# Patient Record
Sex: FEMALE | Race: WHITE | NOT HISPANIC OR LATINO | Employment: FULL TIME | URBAN - METROPOLITAN AREA
[De-identification: names, ages, dates, MRNs, and addresses within clinical notes are randomized per-mention and may not be internally consistent; named-entity substitution may affect disease eponyms.]

---

## 2022-09-06 ENCOUNTER — TELEPHONE (OUTPATIENT)
Dept: PSYCHIATRY | Facility: CLINIC | Age: 22
End: 2022-09-06

## 2023-05-24 ENCOUNTER — OFFICE VISIT (OUTPATIENT)
Dept: ENDOCRINOLOGY | Facility: CLINIC | Age: 23
End: 2023-05-24
Payer: COMMERCIAL

## 2023-05-24 ENCOUNTER — LAB VISIT (OUTPATIENT)
Dept: LAB | Facility: HOSPITAL | Age: 23
End: 2023-05-24
Attending: INTERNAL MEDICINE
Payer: COMMERCIAL

## 2023-05-24 VITALS
BODY MASS INDEX: 34.25 KG/M2 | HEART RATE: 82 BPM | DIASTOLIC BLOOD PRESSURE: 68 MMHG | OXYGEN SATURATION: 97 % | HEIGHT: 63 IN | SYSTOLIC BLOOD PRESSURE: 114 MMHG | WEIGHT: 193.31 LBS

## 2023-05-24 DIAGNOSIS — E78.00 HYPERCHOLESTEREMIA: ICD-10-CM

## 2023-05-24 DIAGNOSIS — E03.9 HYPOTHYROIDISM, UNSPECIFIED TYPE: ICD-10-CM

## 2023-05-24 DIAGNOSIS — E11.9 TYPE 2 DIABETES MELLITUS WITHOUT COMPLICATION, WITHOUT LONG-TERM CURRENT USE OF INSULIN: ICD-10-CM

## 2023-05-24 DIAGNOSIS — R94.6 ABNORMAL THYROID FUNCTION TEST: ICD-10-CM

## 2023-05-24 DIAGNOSIS — E22.1 HYPERPROLACTINEMIA: ICD-10-CM

## 2023-05-24 DIAGNOSIS — K76.0 FATTY LIVER: ICD-10-CM

## 2023-05-24 DIAGNOSIS — E88.819 INSULIN RESISTANCE: ICD-10-CM

## 2023-05-24 LAB
PROLACTIN SERPL IA-MCNC: 37 NG/ML (ref 5.2–26.5)
TSH SERPL DL<=0.005 MIU/L-ACNC: 2.93 UIU/ML (ref 0.4–4)

## 2023-05-24 PROCEDURE — 3078F PR MOST RECENT DIASTOLIC BLOOD PRESSURE < 80 MM HG: ICD-10-PCS | Mod: CPTII,S$GLB,, | Performed by: INTERNAL MEDICINE

## 2023-05-24 PROCEDURE — 1159F MED LIST DOCD IN RCRD: CPT | Mod: CPTII,S$GLB,, | Performed by: INTERNAL MEDICINE

## 2023-05-24 PROCEDURE — 3008F PR BODY MASS INDEX (BMI) DOCUMENTED: ICD-10-PCS | Mod: CPTII,S$GLB,, | Performed by: INTERNAL MEDICINE

## 2023-05-24 PROCEDURE — 99999 PR PBB SHADOW E&M-EST. PATIENT-LVL IV: ICD-10-PCS | Mod: PBBFAC,,, | Performed by: INTERNAL MEDICINE

## 2023-05-24 PROCEDURE — 3074F SYST BP LT 130 MM HG: CPT | Mod: CPTII,S$GLB,, | Performed by: INTERNAL MEDICINE

## 2023-05-24 PROCEDURE — 3044F HG A1C LEVEL LT 7.0%: CPT | Mod: CPTII,S$GLB,, | Performed by: INTERNAL MEDICINE

## 2023-05-24 PROCEDURE — 3008F BODY MASS INDEX DOCD: CPT | Mod: CPTII,S$GLB,, | Performed by: INTERNAL MEDICINE

## 2023-05-24 PROCEDURE — 36415 COLL VENOUS BLD VENIPUNCTURE: CPT | Performed by: INTERNAL MEDICINE

## 2023-05-24 PROCEDURE — 1159F PR MEDICATION LIST DOCUMENTED IN MEDICAL RECORD: ICD-10-PCS | Mod: CPTII,S$GLB,, | Performed by: INTERNAL MEDICINE

## 2023-05-24 PROCEDURE — 99204 PR OFFICE/OUTPT VISIT, NEW, LEVL IV, 45-59 MIN: ICD-10-PCS | Mod: S$GLB,,, | Performed by: INTERNAL MEDICINE

## 2023-05-24 PROCEDURE — 99999 PR PBB SHADOW E&M-EST. PATIENT-LVL IV: CPT | Mod: PBBFAC,,, | Performed by: INTERNAL MEDICINE

## 2023-05-24 PROCEDURE — 99204 OFFICE O/P NEW MOD 45 MIN: CPT | Mod: S$GLB,,, | Performed by: INTERNAL MEDICINE

## 2023-05-24 PROCEDURE — 3078F DIAST BP <80 MM HG: CPT | Mod: CPTII,S$GLB,, | Performed by: INTERNAL MEDICINE

## 2023-05-24 PROCEDURE — 84146 ASSAY OF PROLACTIN: CPT | Performed by: INTERNAL MEDICINE

## 2023-05-24 PROCEDURE — 3074F PR MOST RECENT SYSTOLIC BLOOD PRESSURE < 130 MM HG: ICD-10-PCS | Mod: CPTII,S$GLB,, | Performed by: INTERNAL MEDICINE

## 2023-05-24 PROCEDURE — 84443 ASSAY THYROID STIM HORMONE: CPT | Performed by: INTERNAL MEDICINE

## 2023-05-24 PROCEDURE — 3044F PR MOST RECENT HEMOGLOBIN A1C LEVEL <7.0%: ICD-10-PCS | Mod: CPTII,S$GLB,, | Performed by: INTERNAL MEDICINE

## 2023-05-24 RX ORDER — PROPRANOLOL HYDROCHLORIDE 10 MG/1
10 TABLET ORAL 2 TIMES DAILY
COMMUNITY
Start: 2023-05-23

## 2023-05-24 RX ORDER — METFORMIN HYDROCHLORIDE 500 MG/1
500 TABLET, EXTENDED RELEASE ORAL 2 TIMES DAILY WITH MEALS
Qty: 60 TABLET | Refills: 3 | Status: SHIPPED | OUTPATIENT
Start: 2023-05-24 | End: 2024-05-23

## 2023-05-24 RX ORDER — OXCARBAZEPINE 300 MG/1
TABLET, FILM COATED ORAL
COMMUNITY
Start: 2023-05-23

## 2023-05-24 RX ORDER — TRETINOIN 0.5 MG/G
CREAM TOPICAL
COMMUNITY
Start: 2023-01-23

## 2023-05-24 NOTE — ASSESSMENT & PLAN NOTE
Single elevated level   Plan to check today   If TSH between 4 - 10 would increase to 50 mcg   Repeat in three to four months

## 2023-05-24 NOTE — ASSESSMENT & PLAN NOTE
[facility] - [facility] 3.7  Discussed dietary changes   Have provided hand out     Has 1 hr of 225 and fasting of 100   Ok to start metformin, discussed s/e and benefits.   Plan to titrate slowly to avoid s/e

## 2023-05-24 NOTE — PATIENT INSTRUCTIONS
Goal weight loss 5% body weight ( 10 lbs)  Try the following for six weeks.   Weigh yourself daily    Simple plan: avoid anything made with flour or sugar.     Increase vegetables, lean proteins and limit carbohydrates.     Choose high fiber carbohydrates, that is a carbohydrate that has more than 3 - 5 grams of fiber per serving. Examples of starchy foods that are good for you are beans, squash.      Drink plenty of water and avoid drinks with sugar such as regular sodas.     No snacking     Nutrition plan:  1) lower carbohydrate plans   2) intermittent fasting     Book:   How to lose weight for the last time, Dr. Anu Guzman     Instructions for metformin:  Start with Metformin 500 mg XR one tablet for 7 dys - 10 dys  Then increase to Metformin 500 mg XR one tablet twice a day for the remainder of the month.     Stay on this dose and send me a message if you are tolerating well. Then we can increase dose of Metformin to 750 mg1 tablet twice a day.    Side effects include abdominal discomfort and diarrhea. It usually resolves on its own. Take the medicine with food as this helps.     Plan to start spironolactone for hair growth, in about one month.     Spironolactone is a weak diuretic that your body gets used to after a few days. It works well to block the effects of testosterone.     We recommend you avoid pregnancy while using this medications as it can cause fetal malformations.   We recommend using barrier contraception or a contraceptive device or pill.     Typically we start with spironolactone 50 mg one tablet twice a day. We typically check a potassium level about one month after starting the medication.     Please let me know how you feel after the first month of use. If you are feeling well we increase the dose to 50 mg twice daily.       Follow up in six months, call July 3rd for an appointment in November.

## 2023-05-24 NOTE — PROGRESS NOTES
Subjective:      Patient ID: Jodie Siegel is a 22 y.o. female.    Chief Complaint:  Abnormal Lab, Diabetes, and Hypothyroidism      History of Present Illness  Ms. Siegel is a 22 year old woman who is here for multiple medical problems.     Reports fatigue and fatigue after eating a high sugar food.     Prolactin levels checked by OBGYN.   Currently on risperidone.  Denies galactorrhea.   Denies new headaches/changes to vision.   Reports numbness of her head.   Has IUD in place three years.     Denies history of thyroid disorders   Reports feeling exhausted/fatigue with minimal activity.   Started levothyroxine 25 mcg daily, one week ago. Takes medicine before other pills or food.   Maternal grandmother with thyroid cancer      Insulin resistance diagnosed at the time of evaluation for PCOS.   Currently on IUD  Terminal hair over chin and neck.  Started terminal hair when she had her menstrual cycles at age 9/10.  Has cystic acne   No other treatment for hair growth  Has not used spironolactone.     Vitamins/supplements: melatonin at night    Exercise: walks three times a day (about one mile)  Family history:   Hirsutism mother and sister    Review of Systems  No recent illness    Objective:   Physical Exam  Constitutional:       General: She is not in acute distress.     Appearance: Normal appearance. She is well-developed.   Eyes:      General: No scleral icterus.     Extraocular Movements: Extraocular movements intact.      Conjunctiva/sclera: Conjunctivae normal.      Pupils: Pupils are equal, round, and reactive to light.      Comments: No proptosis.    Neck:      Thyroid: No thyromegaly.      Trachea: No tracheal deviation.   Cardiovascular:      Rate and Rhythm: Normal rate.   Pulmonary:      Effort: Pulmonary effort is normal.      Breath sounds: Normal breath sounds.   Chest:   Breasts:     Right: No nipple discharge.      Left: No nipple discharge.   Musculoskeletal:      Cervical back: Normal range  "of motion and neck supple.   Lymphadenopathy:      Cervical: No cervical adenopathy.   Skin:     General: Skin is warm and dry.      Findings: No rash.   Neurological:      Mental Status: She is alert.      Deep Tendon Reflexes: Reflexes are normal and symmetric.      Comments: No tremor.     Vitals:    05/24/23 1311   BP: 114/68   BP Location: Right arm   Patient Position: Sitting   BP Method: Medium (Manual)   Pulse: 82   SpO2: 97%   Weight: 87.7 kg (193 lb 5.5 oz)   Height: 5' 3" (1.6 m)       BP Readings from Last 3 Encounters:   05/24/23 114/68   05/17/23 111/75   03/21/23 130/86     Wt Readings from Last 1 Encounters:   05/24/23 1311 87.7 kg (193 lb 5.5 oz)         Body mass index is 34.25 kg/m².    Lab Review:   Lab Results   Component Value Date    HGBA1C 5.6 05/01/2023     Lab Results   Component Value Date    CHOL 202 (H) 05/01/2023    HDL 70 05/01/2023    LDLCALC 121 (H) 05/01/2023    TRIG 61 05/01/2023     Lab Results   Component Value Date     05/01/2023    K 3.9 05/01/2023    CL 99 05/01/2023    CO2 23 05/01/2023    GLU 89 05/01/2023    BUN 9 05/01/2023    CREATININE 0.64 05/01/2023    CALCIUM 9.2 05/01/2023    ALBUMIN 4.4 05/01/2023    BILITOT 0.3 05/01/2023    AST 26 05/01/2023    ALT 40 (H) 05/01/2023    TSH 6.460 (H) 05/01/2023         [facility] --> 3.7  Assessment and Plan     Hyperprolactinemia  Currently on risperidone - high risk medication   No galactorrhea or headaches   Currently on IUD    Plan:   MRI of pituitary     Insulin resistance  [facility] - UIR 3.7  Discussed dietary changes   Have provided hand out     Has 1 hr of 225 and fasting of 100   Ok to start metformin, discussed s/e and benefits.   Plan to titrate slowly to avoid s/e    Abnormal thyroid function test  Single elevated level   Plan to check today   If TSH between 4 - 10 would increase to 50 mcg   Repeat in three to four months           "

## 2023-05-24 NOTE — ASSESSMENT & PLAN NOTE
Currently on risperidone - high risk medication   No galactorrhea or headaches   Currently on IUD    Plan:   MRI of pituitary

## 2023-05-29 ENCOUNTER — PATIENT MESSAGE (OUTPATIENT)
Dept: ENDOCRINOLOGY | Facility: CLINIC | Age: 23
End: 2023-05-29
Payer: COMMERCIAL

## 2023-05-29 DIAGNOSIS — E22.1 HYPERPROLACTINEMIA: Primary | ICD-10-CM

## 2023-06-12 ENCOUNTER — HOSPITAL ENCOUNTER (EMERGENCY)
Facility: HOSPITAL | Age: 23
Discharge: HOME | End: 2023-06-12
Attending: EMERGENCY MEDICINE
Payer: COMMERCIAL

## 2023-06-12 VITALS
WEIGHT: 198.63 LBS | BODY MASS INDEX: 35.18 KG/M2 | RESPIRATION RATE: 14 BRPM | HEART RATE: 55 BPM | SYSTOLIC BLOOD PRESSURE: 131 MMHG | TEMPERATURE: 99 F | DIASTOLIC BLOOD PRESSURE: 66 MMHG | OXYGEN SATURATION: 99 %

## 2023-06-12 DIAGNOSIS — R07.9 CHEST PAIN, UNSPECIFIED TYPE: Primary | ICD-10-CM

## 2023-06-12 DIAGNOSIS — R00.0 TACHYCARDIA: ICD-10-CM

## 2023-06-12 LAB
ALBUMIN SERPL BCP-MCNC: 4 G/DL (ref 3.5–5.2)
ALP SERPL-CCNC: 86 U/L (ref 55–135)
ALT SERPL W/O P-5'-P-CCNC: 33 U/L (ref 10–44)
ANION GAP SERPL CALC-SCNC: 11 MMOL/L (ref 8–16)
AST SERPL-CCNC: 28 U/L (ref 10–40)
B-HCG UR QL: NEGATIVE
BASOPHILS # BLD AUTO: 0.05 K/UL (ref 0–0.2)
BASOPHILS NFR BLD: 0.7 % (ref 0–1.9)
BILIRUB SERPL-MCNC: 0.3 MG/DL (ref 0.1–1)
BILIRUB UR QL STRIP: NEGATIVE
BNP SERPL-MCNC: 36 PG/ML (ref 0–99)
BUN SERPL-MCNC: 11 MG/DL (ref 6–20)
CALCIUM SERPL-MCNC: 9.4 MG/DL (ref 8.7–10.5)
CHLORIDE SERPL-SCNC: 109 MMOL/L (ref 95–110)
CLARITY UR: CLEAR
CO2 SERPL-SCNC: 20 MMOL/L (ref 23–29)
COLOR UR: YELLOW
CREAT SERPL-MCNC: 0.6 MG/DL (ref 0.5–1.4)
DIFFERENTIAL METHOD: NORMAL
EOSINOPHIL # BLD AUTO: 0.1 K/UL (ref 0–0.5)
EOSINOPHIL NFR BLD: 1.4 % (ref 0–8)
ERYTHROCYTE [DISTWIDTH] IN BLOOD BY AUTOMATED COUNT: 12.2 % (ref 11.5–14.5)
EST. GFR  (NO RACE VARIABLE): >60 ML/MIN/1.73 M^2
GLUCOSE SERPL-MCNC: 101 MG/DL (ref 70–110)
GLUCOSE UR QL STRIP: NEGATIVE
HCG INTACT+B SERPL-ACNC: <1.2 MIU/ML
HCT VFR BLD AUTO: 41.4 % (ref 37–48.5)
HCV AB SERPL QL IA: NEGATIVE
HEP C VIRUS HOLD SPECIMEN: NORMAL
HGB BLD-MCNC: 13.8 G/DL (ref 12–16)
HGB UR QL STRIP: NEGATIVE
HIV 1+2 AB+HIV1 P24 AG SERPL QL IA: NEGATIVE
IMM GRANULOCYTES # BLD AUTO: 0.02 K/UL (ref 0–0.04)
IMM GRANULOCYTES NFR BLD AUTO: 0.3 % (ref 0–0.5)
KETONES UR QL STRIP: NEGATIVE
LACTATE SERPL-SCNC: 1.5 MMOL/L (ref 0.5–2.2)
LEUKOCYTE ESTERASE UR QL STRIP: NEGATIVE
LIPASE SERPL-CCNC: 16 U/L (ref 4–60)
LYMPHOCYTES # BLD AUTO: 2.1 K/UL (ref 1–4.8)
LYMPHOCYTES NFR BLD: 29.2 % (ref 18–48)
MCH RBC QN AUTO: 27.7 PG (ref 27–31)
MCHC RBC AUTO-ENTMCNC: 33.3 G/DL (ref 32–36)
MCV RBC AUTO: 83 FL (ref 82–98)
MONOCYTES # BLD AUTO: 0.6 K/UL (ref 0.3–1)
MONOCYTES NFR BLD: 7.8 % (ref 4–15)
NEUTROPHILS # BLD AUTO: 4.3 K/UL (ref 1.8–7.7)
NEUTROPHILS NFR BLD: 60.6 % (ref 38–73)
NITRITE UR QL STRIP: NEGATIVE
NRBC BLD-RTO: 0 /100 WBC
PH UR STRIP: 7 [PH] (ref 5–8)
PLATELET # BLD AUTO: 234 K/UL (ref 150–450)
PMV BLD AUTO: 11.2 FL (ref 9.2–12.9)
POTASSIUM SERPL-SCNC: 3.8 MMOL/L (ref 3.5–5.1)
PROCALCITONIN SERPL IA-MCNC: <0.02 NG/ML
PROT SERPL-MCNC: 7.2 G/DL (ref 6–8.4)
PROT UR QL STRIP: NEGATIVE
RBC # BLD AUTO: 4.98 M/UL (ref 4–5.4)
SODIUM SERPL-SCNC: 140 MMOL/L (ref 136–145)
SP GR UR STRIP: 1.02 (ref 1–1.03)
TROPONIN I SERPL DL<=0.01 NG/ML-MCNC: <0.006 NG/ML (ref 0–0.03)
URN SPEC COLLECT METH UR: NORMAL
UROBILINOGEN UR STRIP-ACNC: NEGATIVE EU/DL
WBC # BLD AUTO: 7.16 K/UL (ref 3.9–12.7)

## 2023-06-12 PROCEDURE — 84145 PROCALCITONIN (PCT): CPT | Performed by: EMERGENCY MEDICINE

## 2023-06-12 PROCEDURE — 83690 ASSAY OF LIPASE: CPT | Performed by: EMERGENCY MEDICINE

## 2023-06-12 PROCEDURE — 85025 COMPLETE CBC W/AUTO DIFF WBC: CPT | Performed by: EMERGENCY MEDICINE

## 2023-06-12 PROCEDURE — 81003 URINALYSIS AUTO W/O SCOPE: CPT | Performed by: EMERGENCY MEDICINE

## 2023-06-12 PROCEDURE — 93005 ELECTROCARDIOGRAM TRACING: CPT

## 2023-06-12 PROCEDURE — 99285 EMERGENCY DEPT VISIT HI MDM: CPT | Mod: 25

## 2023-06-12 PROCEDURE — 87040 BLOOD CULTURE FOR BACTERIA: CPT | Performed by: EMERGENCY MEDICINE

## 2023-06-12 PROCEDURE — 84702 CHORIONIC GONADOTROPIN TEST: CPT | Performed by: EMERGENCY MEDICINE

## 2023-06-12 PROCEDURE — 81025 URINE PREGNANCY TEST: CPT | Performed by: EMERGENCY MEDICINE

## 2023-06-12 PROCEDURE — 25500020 PHARM REV CODE 255: Performed by: EMERGENCY MEDICINE

## 2023-06-12 PROCEDURE — 36415 COLL VENOUS BLD VENIPUNCTURE: CPT | Performed by: EMERGENCY MEDICINE

## 2023-06-12 PROCEDURE — 83880 ASSAY OF NATRIURETIC PEPTIDE: CPT | Performed by: EMERGENCY MEDICINE

## 2023-06-12 PROCEDURE — 93010 ELECTROCARDIOGRAM REPORT: CPT | Mod: ,,, | Performed by: INTERNAL MEDICINE

## 2023-06-12 PROCEDURE — 87389 HIV-1 AG W/HIV-1&-2 AB AG IA: CPT | Performed by: EMERGENCY MEDICINE

## 2023-06-12 PROCEDURE — 80053 COMPREHEN METABOLIC PANEL: CPT | Performed by: EMERGENCY MEDICINE

## 2023-06-12 PROCEDURE — 86803 HEPATITIS C AB TEST: CPT | Performed by: EMERGENCY MEDICINE

## 2023-06-12 PROCEDURE — 83605 ASSAY OF LACTIC ACID: CPT | Performed by: EMERGENCY MEDICINE

## 2023-06-12 PROCEDURE — 93010 EKG 12-LEAD: ICD-10-PCS | Mod: ,,, | Performed by: INTERNAL MEDICINE

## 2023-06-12 PROCEDURE — 84484 ASSAY OF TROPONIN QUANT: CPT | Performed by: EMERGENCY MEDICINE

## 2023-06-12 RX ORDER — NAPROXEN 375 MG/1
375 TABLET ORAL 2 TIMES DAILY WITH MEALS
Qty: 30 TABLET | Refills: 0 | Status: SHIPPED | OUTPATIENT
Start: 2023-06-12 | End: 2023-06-12 | Stop reason: DRUGHIGH

## 2023-06-12 RX ORDER — HYDROCODONE BITARTRATE AND ACETAMINOPHEN 10; 325 MG/1; MG/1
1 TABLET ORAL EVERY 6 HOURS PRN
Qty: 12 TABLET | Refills: 0 | Status: SHIPPED | OUTPATIENT
Start: 2023-06-12 | End: 2023-06-17

## 2023-06-12 RX ADMIN — IOHEXOL 100 ML: 350 INJECTION, SOLUTION INTRAVENOUS at 11:06

## 2023-06-12 NOTE — PHARMACY MED REC
"Admission Medication History     The home medication history was taken by Nick Chaudhari.    You may go to "Admission" then "Reconcile Home Medications" tabs to review and/or act upon these items.     The home medication list has been updated by the Pharmacy department.   Please read ALL comments highlighted in yellow.   Please address this information as you see fit.    Feel free to contact us if you have any questions or require assistance.      The medications listed below were removed from the home medication list. Please reorder if appropriate:  Patient reports no longer taking the following medication(s):  RIDPERDAL 0.5MG    Medications listed below were obtained from: Patient/family and Analytic software- [employer]  (Not in a hospital admission)      Nick Chaudhari  [phone]    Current Outpatient Medications on File Prior to Encounter   Medication Sig Dispense Refill Last Dose    hydrOXYzine pamoate (VISTARIL) 25 MG Cap Take 25 mg by mouth every 8 (eight) hours as needed (itching).   6/11/2023    levothyroxine (SYNTHROID) 25 MCG tablet Take 1 tablet (25 mcg total) by mouth before breakfast. 30 tablet 11 6/11/2023    metFORMIN (GLUCOPHAGE-XR) 500 MG ER 24hr tablet Take 1 tablet (500 mg total) by mouth 2 (two) times daily with meals. 60 tablet 3 6/11/2023    OXcarbazepine (TRILEPTAL) 300 MG Tab Take 300 mg by mouth every morning and 600 mg nightly   6/11/2023    prazosin (MINIPRESS) 1 MG Cap Take 1 capsule by mouth every evening.   6/11/2023    propranoloL (INDERAL) 10 MG tablet Take 10 mg by mouth 2 (two) times daily.   6/11/2023    risperiDONE (RISPERDAL) 2 MG tablet Take 2 mg by mouth every evening.   6/11/2023    venlafaxine (EFFEXOR-XR) 150 MG Cp24 TAKE 1 CAPSULE BY MOUTH DAILY WITH BREAKFAST   6/11/2023    tretinoin (RETIN-A) 0.05 % cream APPLY THIN LAYER TOPICALLY TO THE AFFECTED AREA EVERY NIGHT      .        "

## 2023-06-12 NOTE — Clinical Note
"Jodie Forbesmanda Siegel was seen and treated in our emergency department on 6/12/2023.  She may return to work on 06/14/2023.       If you have any questions or concerns, please don't hesitate to call.      Jackson Mcnair MD RN    "

## 2023-06-12 NOTE — Clinical Note
"Jodie Forbesmanda Siegel was seen and treated in our emergency department on 6/12/2023.  She may return to school on 06/14/2023.      If you have any questions or concerns, please don't hesitate to call.      Jackson Mcnair MD RN"

## 2023-06-12 NOTE — ED PROVIDER NOTES
SCRIBE #1 NOTE: I, Warner Hampton, am scribing for, and in the presence of, Jackson Mcnair MD. I have scribed the entire note.      History      Chief Complaint   Patient presents with    Dizziness    Shortness of Breath    Chest Pain     Pt states she was driving to school and began having chest pain 7/10 with SOB and lightheadedness with dizziness. Pt states she began blacking out and called EMS. Pt states she is being tested for prolactinoma currently that is suspected.       Review of patient's allergies indicates:   Allergen Reactions    [place] nut         HPI   HPI    6/12/2023, 8:47 AM   History obtained from the patient and [facility]      History of Present Illness: Jodie Siegel is a 22 y.o. female patient who presents to the Emergency Department for chest pain, onset this morning. Symptoms are constant and moderate in severity. No mitigating or exacerbating factors reported. Associated sxs include SOB. Pt states that she was driving herself to the hospital for her chest pain when she became lightheaded, prompting her to pull over and call [facility]. [facility] reports that the pt was hypotensive at the scene. Patient denies any fever, chills, n/v/d, weakness, numbness, syncope, headache, and all other sxs at this time. No prior Tx reported. No further complaints or concerns at this time.     Arrival mode: [facility]    PCP: Jose Angel Nicole MD       Past Medical History:  Past Medical History:   Diagnosis Date    Insulin resistance        Past Surgical History:  Past Surgical History:   Procedure Laterality Date    ADENOIDECTOMY      TONSILLECTOMY      TYMPANOSTOMY TUBE PLACEMENT           Family History:  Family History   Problem Relation Age of Onset    Hypertension Mother     Ovarian cancer Mother     Hypertension Father     Thyroid cancer Maternal Grandmother     Throat cancer Maternal Grandfather     Diabetes Paternal Grandmother     Diabetes Paternal Grandfather        Social History:  Social History     Tobacco Use     Smoking status: Every Day     Packs/day: 1.50     Types: Cigarettes    Smokeless tobacco: Never   Substance and Sexual Activity    Alcohol use: Never    Drug use: Not on file    Sexual activity: Yes     Partners: Male     Birth control/protection: I.U.D.     Comment: [email or web address] 8/20/2020       ROS   Review of Systems   Constitutional:  Negative for chills and fever.   HENT:  Negative for sore throat.    Respiratory:  Positive for shortness of breath.    Cardiovascular:  Positive for chest pain.   Gastrointestinal:  Negative for diarrhea, nausea and vomiting.   Genitourinary:  Negative for dysuria.   Musculoskeletal:  Negative for back pain.   Skin:  Negative for rash.   Neurological:  Positive for light-headedness. Negative for syncope, weakness, numbness and headaches.   Hematological:  Does not bruise/bleed easily.   All other systems reviewed and are negative.    Physical Exam      Initial Vitals   BP Pulse Resp Temp SpO2   06/12/23 0902 06/12/23 0902 06/12/23 0901 06/12/23 1228 06/12/23 0902   120/73 74 18 98.9 °F (37.2 °C) 99 %      MAP       --                 Physical Exam  Nursing Notes and Vital Signs Reviewed.  Constitutional: Patient is in no acute distress. Well-developed and well-nourished.  Head: Atraumatic. Normocephalic.  Eyes: PERRL. EOM intact. Conjunctivae are not pale. No scleral icterus.  ENT: Mucous membranes are moist. Oropharynx is clear and symmetric.    Neck: Supple. Full ROM.   Cardiovascular: Regular rate. Regular rhythm. No murmurs, rubs, or gallops. Distal pulses are 2+ and symmetric.  Pulmonary/Chest: No respiratory distress. Clear to auscultation bilaterally. No wheezing or rales.  Abdominal: Soft and non-distended.  There is no tenderness.  No rebound, guarding, or rigidity.   Musculoskeletal: Moves all extremities. No obvious deformities. No edema.  Skin: Warm and dry.  Neurological:  Alert, awake, and appropriate.  Normal speech.  No acute focal neurological deficits are  appreciated.  Psychiatric: Normal affect. Good eye contact. Appropriate in content.    ED Course    Procedures  ED Vital Signs:  Vitals:    06/12/23 0901 06/12/23 0902 06/12/23 0915 06/12/23 1032   BP:  120/73  117/65   Pulse:  74  64   Resp: 18   20   Temp:       TempSrc:       SpO2:  99%  97%   Weight:   90.1 kg (198 lb 9.6 oz)     06/12/23 1102 06/12/23 1104 06/12/23 1220 06/12/23 1228   BP: (!) 125/59  131/66    Pulse: 60  (!) 55    Resp:  20 14    Temp:    98.9 °F (37.2 °C)   TempSrc:    Oral   SpO2: 97%  99%    Weight:           Abnormal Lab Results:  Labs Reviewed   COMPREHENSIVE METABOLIC PANEL - Abnormal; Notable for the following components:       Result Value    CO2 20 (*)     All other components within normal limits   CULTURE, BLOOD   CULTURE, BLOOD   CBC W/ AUTO DIFFERENTIAL   LACTIC ACID, PLASMA   URINALYSIS, REFLEX TO URINE CULTURE    Narrative:     Specimen Source->Urine   B-TYPE NATRIURETIC PEPTIDE   LIPASE   TROPONIN I   PROCALCITONIN   HIV 1 / 2 ANTIBODY    Narrative:     Release to patient->Immediate   HEPATITIS C ANTIBODY    Narrative:     Release to patient->Immediate   HEP C VIRUS HOLD SPECIMEN    Narrative:     Release to patient->Immediate   PREGNANCY TEST, URINE RAPID   HCG, QUANTITATIVE   HCG, QUANTITATIVE        All Lab Results:  Results for orders placed or performed during the hospital encounter of 06/12/23   CBC auto differential   Result Value Ref Range    WBC 7.16 3.90 - 12.70 K/uL    RBC 4.98 4.00 - 5.40 M/uL    Hemoglobin 13.8 12.0 - 16.0 g/dL    Hematocrit 41.4 37.0 - 48.5 %    MCV 83 82 - 98 fL    MCH 27.7 27.0 - 31.0 pg    MCHC 33.3 32.0 - 36.0 g/dL    RDW 12.2 11.5 - 14.5 %    Platelets 234 150 - 450 K/uL    MPV 11.2 9.2 - 12.9 fL    Immature Granulocytes 0.3 0.0 - 0.5 %    Gran # (ANC) 4.3 1.8 - 7.7 K/uL    Immature Grans (Abs) 0.02 0.00 - 0.04 K/uL    Lymph # 2.1 1.0 - 4.8 K/uL    Mono # 0.6 0.3 - 1.0 K/uL    Eos # 0.1 0.0 - 0.5 K/uL    Baso # 0.05 0.00 - 0.20 K/uL    nRBC 0  0 /100 WBC    Gran % 60.6 38.0 - 73.0 %    Lymph % 29.2 18.0 - 48.0 %    Mono % 7.8 4.0 - 15.0 %    Eosinophil % 1.4 0.0 - 8.0 %    Basophil % 0.7 0.0 - 1.9 %    Differential Method Automated    Comprehensive metabolic panel   Result Value Ref Range    Sodium 140 136 - 145 mmol/L    Potassium 3.8 3.5 - 5.1 mmol/L    Chloride 109 95 - 110 mmol/L    CO2 20 (L) 23 - 29 mmol/L    Glucose 101 70 - 110 mg/dL    BUN 11 6 - 20 mg/dL    Creatinine 0.6 0.5 - 1.4 mg/dL    Calcium 9.4 8.7 - 10.5 mg/dL    Total Protein 7.2 6.0 - 8.4 g/dL    Albumin 4.0 3.5 - 5.2 g/dL    Total Bilirubin 0.3 0.1 - 1.0 mg/dL    Alkaline Phosphatase 86 55 - 135 U/L    AST 28 10 - 40 U/L    ALT 33 10 - 44 U/L    Anion Gap 11 8 - 16 mmol/L    eGFR >60 >60 mL/min/1.73 m^2   Lactic acid, plasma #1   Result Value Ref Range    Lactate (Lactic Acid) 1.5 0.5 - 2.2 mmol/L   Urinalysis, Reflex to Urine Culture Urine, Clean Catch    Specimen: Urine   Result Value Ref Range    Specimen UA Urine, Clean Catch     Color, UA Yellow Yellow, Straw, Yola    Appearance, UA Clear Clear    pH, UA 7.0 5.0 - 8.0    Specific Gravity, UA 1.020 1.005 - 1.030    Protein, UA Negative Negative    Glucose, UA Negative Negative    Ketones, UA Negative Negative    Bilirubin (UA) Negative Negative    Occult Blood UA Negative Negative    Nitrite, UA Negative Negative    Urobilinogen, UA Negative <2.0 EU/dL    Leukocytes, UA Negative Negative   Brain natriuretic peptide   Result Value Ref Range    BNP 36 0 - 99 pg/mL   Lipase   Result Value Ref Range    Lipase 16 4 - 60 U/L   Troponin I   Result Value Ref Range    Troponin I <0.006 0.000 - 0.026 ng/mL   Procalcitonin   Result Value Ref Range    Procalcitonin <0.02 <0.25 ng/mL   HIV 1/2 Ag/Ab (4th Gen)   Result Value Ref Range    HIV 1/2 Ag/Ab Negative Negative   Hepatitis C Antibody   Result Value Ref Range    Hepatitis C Ab Negative Negative   HCV Virus Hold Specimen   Result Value Ref Range    HEP C Virus Hold Specimen Hold for HCV  sendout    Pregnancy, urine rapid   Result Value Ref Range    Preg Test, Ur Negative    HCG, Quantitative   Result Value Ref Range    HCG Quant <1.2 See Text mIU/mL     Imaging Results:  Imaging Results              CTA Chest Non-Coronary (PE Studies) (Final result)  Result time 06/12/23 11:37:25      Final result by MARTIN Polo Sr., MD (06/12/23 11:37:25)                   Impression:      Normal examination.    All CT scans at this facility use dose modulation, iterative reconstruction, and/or weight base dosing when appropriate to reduce radiation dose when appropriate to reduce radiation dose to as low as reasonably achievable.      Electronically signed by: Mathew Polo MD  Date:    06/12/2023  Time:    11:37               Narrative:    EXAMINATION:  CTA CHEST NON CORONARY (PE STUDIES)    CLINICAL HISTORY:  PE suspected, intermediate prob, neg D-dimer;    TECHNIQUE:  Standard chest CT PE protocol was performed with IV contrast and 3D MIP reformats.  100 mL of Omnipaque 350 contrast material was used for this examination.    COMPARISON:  Comparison was made to a chest x-ray performed on 06/12/2023.    FINDINGS:  The size of heart is within normal limits. The thoracic aorta is normal in appearance. There is no pulmonary embolism. The lungs are clear. There is no pneumothorax or pleural effusion.                                       X-Ray Chest AP Portable (Final result)  Result time 06/12/23 09:47:41      Final result by MARTIN Polo Sr., MD (06/12/23 09:47:41)                   Impression:      Normal study.      Electronically signed by: Mathew Polo MD  Date:    06/12/2023  Time:    09:47               Narrative:    EXAMINATION:  XR CHEST AP PORTABLE    CLINICAL HISTORY:  Sepsis;    COMPARISON:  None    FINDINGS:  The size of the heart is normal. The lungs are clear. There is no pneumothorax.  The costophrenic angles are sharp.                                     The EKG was ordered, reviewed,  and independently interpreted by the ED provider.  Interpretation time: 09:22  Rate: 60 BPM  Rhythm: Sinus rhythm with marked sinus arrhythmia  Interpretation: No acute ST changes. No STEMI.           The Emergency Provider reviewed the vital signs and test results, which are outlined above.    ED Discussion     11:50 AM: Reassessed pt at this time. Discussed with pt all pertinent ED information and results. Discussed pt dx and plan of tx. Gave pt all f/u and return to the ED instructions. All questions and concerns were addressed at this time. Pt expresses understanding of information and instructions, and is comfortable with plan to discharge. Pt is stable for discharge.    I discussed with patient and/or family/caretaker that evaluation in the ED does not suggest any emergent or life threatening medical conditions requiring immediate intervention beyond what was provided in the ED, and I believe patient is safe for discharge.  Regardless, an unremarkable evaluation in the ED does not preclude the development or presence of a serious of life threatening condition. As such, patient was instructed to return immediately for any worsening or change in current symptoms.         ED Medication(s):  Medications   iohexoL (OMNIPAQUE 350) injection 100 mL (100 mLs Intravenous Given 6/12/23 1119)       Discharge Medication List as of 6/12/2023 12:09 PM        START taking these medications    Details   HYDROcodone-acetaminophen (NORCO)  mg per tablet Take 1 tablet by mouth every 6 (six) hours as needed for Pain., Starting Mon 6/12/2023, Until Sat 6/17/2023 at 2359, Normal             Medical Decision Making    Medical Decision Making:   Initial Assessment:   Dizziness and weakness that started prior to arrival.  Got better after fluids en route  Differential Diagnosis:   Anemia, electrolyte abnormality,  Clinical Tests:   Lab Tests: Ordered and Reviewed  Radiological Study: Ordered and Reviewed  Medical Tests: Ordered  and Reviewed  ED Management:  Labs and imaging reviewed by me.  No acute findings .  Considered admission, but patient is negative for any acute process warranting admission.           Scribe Attestation:   Scribe #1: I performed the above scribed service and the documentation accurately describes the services I performed. I attest to the accuracy of the note.    Attending:   Physician Attestation Statement for Scribe #1: I, Jackson Mcnair MD, personally performed the services described in this documentation, as scribed by Warner Hampton, in my presence, and it is both accurate and complete.          Clinical Impression       ICD-10-CM ICD-9-CM   1. Chest pain, unspecified type  R07.9 786.50   2. Tachycardia  R00.0 785.0       Disposition:   Disposition: Discharged  Condition: Stable       Jackson Mcnair MD  06/12/23 5940

## 2023-06-17 LAB
BACTERIA BLD CULT: NORMAL
BACTERIA BLD CULT: NORMAL

## 2023-09-01 ENCOUNTER — HOSPITAL ENCOUNTER (OUTPATIENT)
Dept: RADIOLOGY | Facility: HOSPITAL | Age: 23
Discharge: HOME | End: 2023-09-01
Attending: INTERNAL MEDICINE
Payer: COMMERCIAL

## 2023-09-01 DIAGNOSIS — E22.1 HYPERPROLACTINEMIA: ICD-10-CM

## 2023-09-01 PROCEDURE — 70553 MRI PITUITARY W W/O CONTRAST: ICD-10-PCS | Mod: 26,,, | Performed by: RADIOLOGY

## 2023-09-01 PROCEDURE — 70553 MRI BRAIN STEM W/O & W/DYE: CPT | Mod: 26,,, | Performed by: RADIOLOGY

## 2023-09-01 PROCEDURE — 70553 MRI BRAIN STEM W/O & W/DYE: CPT | Mod: TC,PO

## 2023-09-01 PROCEDURE — 25500020 PHARM REV CODE 255: Mod: PO | Performed by: INTERNAL MEDICINE

## 2023-09-01 PROCEDURE — A9585 GADOBUTROL INJECTION: HCPCS | Mod: PO | Performed by: INTERNAL MEDICINE

## 2023-09-01 RX ORDER — GADOBUTROL 604.72 MG/ML
9 INJECTION INTRAVENOUS
Status: COMPLETED | OUTPATIENT
Start: 2023-09-01 | End: 2023-09-01

## 2023-09-01 RX ADMIN — GADOBUTROL 9 ML: 604.72 INJECTION INTRAVENOUS at 09:09
